# Patient Record
Sex: FEMALE | Race: WHITE | NOT HISPANIC OR LATINO | URBAN - METROPOLITAN AREA
[De-identification: names, ages, dates, MRNs, and addresses within clinical notes are randomized per-mention and may not be internally consistent; named-entity substitution may affect disease eponyms.]

---

## 2023-12-04 PROBLEM — K14.0 TONGUE ULCER: Status: ACTIVE | Noted: 2023-12-04

## 2023-12-04 RX ORDER — TRIAMCINOLONE ACETONIDE 1 MG/G
1 PASTE DENTAL 3 TIMES DAILY
COMMUNITY
Start: 2021-04-26

## 2023-12-04 RX ORDER — ESCITALOPRAM OXALATE 10 MG/1
1 TABLET ORAL DAILY
COMMUNITY
Start: 2016-05-26

## 2023-12-04 RX ORDER — FEXOFENADINE HCL AND PSEUDOEPHEDRINE HCI 180; 240 MG/1; MG/1
1 TABLET, EXTENDED RELEASE ORAL DAILY
COMMUNITY
Start: 2015-12-08

## 2023-12-04 RX ORDER — NORETHINDRONE ACETATE AND ETHINYL ESTRADIOL 1.5-30(21)
1 KIT ORAL DAILY
COMMUNITY
Start: 2017-04-27 | End: 2024-01-31 | Stop reason: SDUPTHER

## 2023-12-04 RX ORDER — ALBUTEROL SULFATE 90 UG/1
2 AEROSOL, METERED RESPIRATORY (INHALATION) EVERY 4 HOURS PRN
COMMUNITY
Start: 2016-04-19

## 2023-12-04 RX ORDER — LEVOCETIRIZINE DIHYDROCHLORIDE 5 MG/1
1 TABLET, FILM COATED ORAL DAILY
COMMUNITY
Start: 2016-11-04

## 2023-12-04 RX ORDER — NORETHINDRONE ACETATE AND ETHINYL ESTRADIOL .03; 1.5 MG/1; MG/1
1 TABLET ORAL DAILY
COMMUNITY
Start: 2018-08-19

## 2023-12-08 NOTE — PROGRESS NOTES
Lakia Palacio female   1970 53 y.o.   [id number]      Chief Complaint  High risk evaluation    History Of Present Illness  Lakia Palacio is a very pleasant 53 year old  woman [facility]. She is here with her , James. She has family history of breast cancer in her paternal aunt, 30s. She denies breast surgery or biopsy.      BREAST IMAGIN2023 Bilateral diagnostic mammogram and ultrasound, indicates BI-RADS Category 2.      FEMALE HISTORY: menarche age 13, nulliparous, OCP's x 20+ years (currently on Junel to regulate cycles), premenopausal, LMP 2023, scattered fibroglandular tissue     FAMILY CANCER HISTORY:  Paternal Aunt: Breast cancer, 30s     Surgical History  She has a past surgical history that includes Other surgical history (2017).     Social History  She has no history on file for tobacco use, alcohol use, and drug use.    Family History  No family history on file.     Allergies  Penicillins    Medications  Current Outpatient Medications   Medication Instructions    albuterol (ProAir HFA) 90 mcg/actuation inhaler 2 puffs, inhalation, Every 4 hours PRN    escitalopram (Lexapro) 10 mg tablet 1 tablet, oral, Daily    fexofenadine-pseudoephedrine (Allegra-D 24 Hour) 180-240 mg 24 hr tablet 1 tablet, oral, Daily    levocetirizine (Xyzal) 5 mg tablet 1 tablet, oral, Daily    norethindrone ac-eth estradioL (Microgestin 1.5/30, 21,) 1.5-30 mg-mcg tablet tablet 1 tablet, oral, Daily    norethindrone-e.estradioL-iron (Microgestin Fe 1.5/30, 28,) 1.5 mg-30 mcg (21)/75 mg (7) tablet 1 tablet, oral, Daily    triamcinolone (Kenalog) 0.1 % oral paste 1 Application, Mouth/Throat, 3 times daily         REVIEW OF SYSTEMS    Constitutional:  Negative for appetite change, fatigue, fever and unexpected weight change.   HENT:  Negative for ear pain, hearing loss, nosebleeds, sore throat and trouble swallowing.    Eyes:  Negative for  discharge, itching and visual disturbance.   Respiratory:  Negative for cough, chest tightness and shortness of breath.    Cardiovascular:  Negative for chest pain, palpitations and leg swelling.   Breast: as indicated in HPI  Gastrointestinal:  Negative for abdominal pain, constipation, diarrhea and nausea.   Endocrine: Negative for cold intolerance and heat intolerance.   Genitourinary:  Negative for dysuria, frequency, hematuria, pelvic pain and vaginal bleeding.   Musculoskeletal:  Negative for arthralgias, back pain, gait problem, joint swelling and myalgias.   Skin:  Negative for color change and rash.   Allergic/Immunologic: Negative for environmental allergies and food allergies.   Neurological:  Negative for dizziness, tremors, speech difficulty, weakness, numbness and headaches.   Hematological:  Does not bruise/bleed easily.   Psychiatric/Behavioral:  Negative for agitation, dysphoric mood and sleep disturbance. The patient is not nervous/anxious.         Past Medical History  She has a past medical history of Personal history of cervical dysplasia, Personal history of other diseases of the circulatory system, and Personal history of other diseases of the respiratory system.     Physical Exam  Patient is alert and oriented x3 and in a relaxed and appropriate mood. Her gait is steady and hand grasps are equal. Sclera is clear. The breasts are nearly symmetrical. The tissue is soft without palpable abnormalities, discrete nodules or masses. The left breast skin and both nipples appear normal. The right inframammary fold has a superficial red rash likely from bra compression. There is no cervical, supraclavicular or axillary lymphadenopathy. Heart rate and rhythm normal, S1 and S2 appreciated. The lungs are clear to auscultation bilaterally. Abdomen is soft and non-tender.     Physical Exam     Last Recorded Vitals  There were no vitals filed for this visit.    Assessment/Plan   Normal clinical exam and  imaging, family history of breast cancer, scattered fibroglandular tissue     Plan: Return in June 2024 for bilateral screening mammogram and office visit.     Patient Discussion/Summary  Your clinical examination and imaging are normal. Please return in one year for mammogram and office visit or sooner if you have any problems or concerns.     You can see your health information, review clinical summaries from office visits & test results online when you follow your health with MY  Chart, a personal health record. To sign up go to [email or web address]. If you need assistance with signing up or trouble getting into your account call [employer] Patient Line 24/7 at [phone].    My office phone number is [phone] if you need to get in touch with me or have additional questions or concerns. Thank you for choosing [facility] and trusting me as your healthcare provider. I look forward to seeing you again at your next office visit. I am honored to be a provider on your health care team and I remain dedicated to helping you achieve your health goals.      Margy Walters, APRN-CNP

## 2023-12-14 ENCOUNTER — APPOINTMENT (OUTPATIENT)
Dept: SURGICAL ONCOLOGY | Facility: CLINIC | Age: 53
End: 2023-12-14
Payer: COMMERCIAL

## 2024-01-31 DIAGNOSIS — N95.1 CLIMACTERIC: Primary | ICD-10-CM

## 2024-01-31 RX ORDER — NORETHINDRONE ACETATE AND ETHINYL ESTRADIOL 1.5-30(21)
1 KIT ORAL DAILY
Qty: 28 TABLET | Refills: 0 | Status: SHIPPED | OUTPATIENT
Start: 2024-01-31 | End: 2024-02-06 | Stop reason: SDUPTHER

## 2024-01-31 NOTE — TELEPHONE ENCOUNTER
Patient called requesting #30 day supply of Loestrin Fe sent to [facility] in [place] and #90 day supply sent to mail order pharmacy.    Message routed to Bea Manzano CNP.

## 2024-02-06 DIAGNOSIS — N95.1 CLIMACTERIC: ICD-10-CM

## 2024-02-06 RX ORDER — NORETHINDRONE ACETATE AND ETHINYL ESTRADIOL 1.5-30(21)
1 KIT ORAL DAILY
Qty: 84 TABLET | Refills: 3 | Status: SHIPPED | OUTPATIENT
Start: 2024-02-06

## 2024-02-06 NOTE — TELEPHONE ENCOUNTER
Patient called requesting #90 day of OCP be sent to Mail order pharmacy.   Message routed to Bea Manzano CNP

## 2024-04-10 NOTE — PROGRESS NOTES
Patient ID: Lakia Palacio is a 54 y.o. female.  Primary Care Provider: Ochoa Willoughby MD    Subjective      Past Medical History  Problems    · History of cervical dysplasia (V13.22) (Z87.410)   · History of extrinsic asthma (V12.69) (Z87.09)   · History of mitral valve prolapse (V12.59) (Z86.79)     Surgical History  Problems    · History of Cervical Conization     Family History  Mother    · Family history of diabetes insipidus (V18.19) (Z83.49)  Paternal Aunt: Breast cancer, 30s     Social History  Problems    · Never a smoker     Allergies  Medication    · Penicillins   Rash; Recorded By: Yoon Tabor; 7/13/2017 9:53:25 AM   · Penicillins Cross [employer]ors   Recorded By: Rekha Flores; 5/29/2015 1:52:56 PM    Objective    There were no vitals taken for this visit.     Interval history:  Patient is a 54 year old female with hx of MATEO III s/p CKC 9/1/2011.  Last pap 4/2022 was negative, HPV-.   Not been seen since. Patient being seen by high risk breast clinic due to moms history of breast cancer.  Mammogram was negative and up to date.  She is still having regular monthly periods, very light.   Patient is currently sexually active, denies dyspareunia or vaginal dryness, frustrated due to lack of orgasm.   Patient denies any constipation or diarrhea.  Appetite has been good.  Energy levels are baseline.  Patient denies hematuria.  Patient denies urinary leakage or incontinence.      Physical Exam:    Constitutional: Doing well. FELA  Eyes: PERRL  ENMT: Moist mucus membranes  Head/Neck: Supple. Symmetrical  Cardiovascular: Regular, rate and rhythm. 2+ equal pulses of the extremities  Respiratory/Thorax: CTA. RRR. Chest rise symmetrical.  Gastrointestinal: Non-distended, soft, non-tender  Genitourinary: Cervical os visualized with no lesions, CKC changes, no CMT, small mobile uterus, no adnexal masses noted. Smooth vaginal walls.  Vulva with no lesions noted.   Musculoskeletal: ROM intact, no joint  swelling, normal strength  Extremities: No edema  Neurological: Alert and oriented x 3. Pleasant and cooperative.  Lymphatic: No lymphadenopathy. No lymphedema  Psychological: Appropriate mood and behavior  Skin: Warm and dry, no lesions, no rashes    A complete review of systems was performed and all systems were normal except what is noted in the interval history.          Assessment/Plan  Patient is a 54 year old female with hx of MAETO III s/p CKC 9/1/2011. Well woman exam.       PLAN:  Pap today, F/U results  If negative F/U in 1 year or as needed  Mammogram order

## 2024-04-11 ENCOUNTER — OFFICE VISIT (OUTPATIENT)
Dept: GYNECOLOGIC ONCOLOGY | Facility: CLINIC | Age: 54
End: 2024-04-11
Payer: COMMERCIAL

## 2024-04-11 VITALS
WEIGHT: 223.2 LBS | HEART RATE: 94 BPM | SYSTOLIC BLOOD PRESSURE: 137 MMHG | OXYGEN SATURATION: 96 % | HEIGHT: 65 IN | RESPIRATION RATE: 18 BRPM | DIASTOLIC BLOOD PRESSURE: 84 MMHG | BODY MASS INDEX: 37.19 KG/M2

## 2024-04-11 DIAGNOSIS — Z12.31 SCREENING MAMMOGRAM, ENCOUNTER FOR: ICD-10-CM

## 2024-04-11 DIAGNOSIS — N87.9 CERVICAL DYSPLASIA: Primary | ICD-10-CM

## 2024-04-11 PROCEDURE — 87624 HPV HI-RISK TYP POOLED RSLT: CPT

## 2024-04-11 PROCEDURE — 99213 OFFICE O/P EST LOW 20 MIN: CPT | Performed by: NURSE PRACTITIONER

## 2024-04-11 PROCEDURE — 88175 CYTOPATH C/V AUTO FLUID REDO: CPT

## 2024-04-22 LAB
CYTOLOGY CMNT CVX/VAG CYTO-IMP: NORMAL
HPV HR 12 DNA GENITAL QL NAA+PROBE: NEGATIVE
HPV HR GENOTYPES PNL CVX NAA+PROBE: NEGATIVE
HPV16 DNA SPEC QL NAA+PROBE: NEGATIVE
HPV18 DNA SPEC QL NAA+PROBE: NEGATIVE
LAB AP HPV GENOTYPE QUESTION: YES
LAB AP HPV HR: NORMAL
LAB AP PREVIOUS ABNORMAL HISTORY: NORMAL
LAB AP TREATMENT HISTORY: NORMAL
LABORATORY COMMENT REPORT: NORMAL
PATH REPORT.TOTAL CANCER: NORMAL

## 2025-01-17 DIAGNOSIS — T38.4X5D ORAL CONTRACEPTIVE CAUSING ADVERSE EFFECT IN THERAPEUTIC USE, SUBSEQUENT ENCOUNTER: Primary | ICD-10-CM

## 2025-01-17 DIAGNOSIS — Z30.41 ORAL CONTRACEPTIVE USE: ICD-10-CM

## 2025-01-17 RX ORDER — NORETHINDRONE ACETATE AND ETHINYL ESTRADIOL .03; 1.5 MG/1; MG/1
1 TABLET ORAL DAILY
Qty: 63 TABLET | Refills: 3 | Status: SHIPPED | OUTPATIENT
Start: 2025-01-17

## 2025-01-17 NOTE — TELEPHONE ENCOUNTER
Received faxed refill request for Aurovelta tabs from [facility].     Refill pended to Bea Manzano CNP

## 2025-03-11 ENCOUNTER — OFFICE VISIT (OUTPATIENT)
Dept: URGENT CARE | Age: 55
End: 2025-03-11
Payer: COMMERCIAL

## 2025-03-11 VITALS
RESPIRATION RATE: 18 BRPM | TEMPERATURE: 97.7 F | WEIGHT: 210 LBS | BODY MASS INDEX: 34.99 KG/M2 | HEART RATE: 94 BPM | HEIGHT: 65 IN | DIASTOLIC BLOOD PRESSURE: 83 MMHG | OXYGEN SATURATION: 98 % | SYSTOLIC BLOOD PRESSURE: 142 MMHG

## 2025-03-11 DIAGNOSIS — J45.21 MILD INTERMITTENT ASTHMA WITH EXACERBATION (HHS-HCC): Primary | ICD-10-CM

## 2025-03-11 PROCEDURE — 3008F BODY MASS INDEX DOCD: CPT | Performed by: PHYSICIAN ASSISTANT

## 2025-03-11 PROCEDURE — 99204 OFFICE O/P NEW MOD 45 MIN: CPT | Performed by: PHYSICIAN ASSISTANT

## 2025-03-11 RX ORDER — PREDNISONE 20 MG/1
TABLET ORAL
Qty: 10 TABLET | Refills: 0 | Status: SHIPPED | OUTPATIENT
Start: 2025-03-11

## 2025-03-11 RX ORDER — BROMPHENIRAMINE MALEATE, PSEUDOEPHEDRINE HYDROCHLORIDE, AND DEXTROMETHORPHAN HYDROBROMIDE 2; 30; 10 MG/5ML; MG/5ML; MG/5ML
10 SYRUP ORAL 4 TIMES DAILY PRN
Qty: 250 ML | Refills: 0 | Status: SHIPPED | OUTPATIENT
Start: 2025-03-11 | End: 2025-03-21

## 2025-03-11 RX ORDER — FEXOFENADINE HCL AND PSEUDOEPHEDRINE HCL 180; 240 MG/1; MG/1
1 TABLET, EXTENDED RELEASE ORAL DAILY
Qty: 30 TABLET | Refills: 0 | Status: SHIPPED | OUTPATIENT
Start: 2025-03-11 | End: 2026-03-11

## 2025-03-11 RX ORDER — ALBUTEROL SULFATE 90 UG/1
2 INHALANT RESPIRATORY (INHALATION) EVERY 6 HOURS PRN
Qty: 18 G | Refills: 0 | Status: SHIPPED | OUTPATIENT
Start: 2025-03-11 | End: 2026-03-11

## 2025-03-11 NOTE — PROGRESS NOTES
"Subjective   Patient ID: Lakia Palacio is a 54 y.o. female. They present today with a chief complaint of Asthma (Asthma cough that keeps her up at night.  had a cold last week and it triggered her asthma. Doesn't use regular inhaler. ).    History of Present Illness  Patient is a pleasant 54-year-old white female, past medical history of asthma, presenting to clinic for chief complaint of asthma exacerbation.  Patient states she has had a cold for the past couple of days and feels like her asthma is flaring.  She states she feels like she is wheezing a little bit and coughing more than normal.  She does report that her asthma usually results in more coughing and wheezing.  She denies any chest pain or shortness of breath.  No fever or chills.  She does report upper respiratory congestion and rhinorrhea.  No abdominal pain, nausea, vomiting.  No chest pain or shortness of breath.  No further complaints.        Asthma      Past Medical History  Allergies as of 03/11/2025 - Reviewed 01/17/2025   Allergen Reaction Noted    Penicillins Rash 12/04/2023       (Not in a hospital admission)         Past Medical History:   Diagnosis Date    Personal history of cervical dysplasia     History of cervical dysplasia    Personal history of other diseases of the circulatory system     History of mitral valve prolapse    Personal history of other diseases of the respiratory system     History of extrinsic asthma       Past Surgical History:   Procedure Laterality Date    OTHER SURGICAL HISTORY  07/13/2017    Cervical Conization            Review of Systems  Review of Systems                               Objective    Vitals:    03/11/25 1626   BP: 142/83   Pulse: 94   Resp: 18   Temp: 36.5 °C (97.7 °F)   TempSrc: Temporal   SpO2: 98%   Weight: 95.3 kg (210 lb)   Height: 1.651 m (5' 5\")     No LMP recorded.    Physical Exam  General: Vitals Noted. No distress. Normocephalic.     HEENT: TMs normal, EOMI, normal " conjunctiva, patent nares, Normal OP    Neck: Supple with no adenopathy.     Cardiac: Regular Rate and Rhythm. No murmur.     Pulmonary: Faint end expiratory wheezing in the lower lung fields bilaterally with no associated rales or rhonchi.  She has good chest wall excursion with equal breath sounds throughout.  No signs of acute distress.    Abdomen: Soft, non-tender, with normal bowel sounds.     Musculoskeletal: Moves all extremities, no effusion, no edema.     Skin: No obvious rashes.  Procedures    Point of Care Test & Imaging Results from this visit    No results found.    Diagnostic study results (if any) were reviewed by Fadi Yepez PA-C.    Assessment/Plan   Allergies, medications, history, and pertinent labs/EKGs/Imaging reviewed by Fadi Yepez PA-C.     Medical Decision Making  Patient was seen eval in the clinic for to complaint of cough.  On exam patient is nontoxic very well-appearing respite comfortably no acute distress.  Vital signs are stable, afebrile.  Heart is regular belly soft and nontender.  She does have faint end expiratory wheezing in lower lung fields bilaterally with overall good air exchange good chest wall excursion and no signs of acute distress.  Based on patient's history and physical exam I do feel she has a viral illness.  I feel this is causing a flare of her asthma.  Will treat with prednisone 40 mg daily x 5 days provided albuterol inhaler and also divided Bromfed-DM to use as needed for cough and congestion.  Advise follow-up primary care physician in the next week.  Reviewed my impression, plan, strict return versus report to ED precautions with the patient.  She expresses understanding and agreement plan of care.      Orders and Diagnoses  Diagnoses and all orders for this visit:  Mild intermittent asthma with exacerbation ([facility])  -     brompheniramine-pseudoeph-DM 2-30-10 mg/5 mL syrup; Take 10 mL by mouth 4 times a day as needed for congestion or cough  for up to 10 days.  -     predniSONE (Deltasone) 20 mg tablet; Take two tablets per day for 5 days  -     albuterol 90 mcg/actuation inhaler; Inhale 2 puffs every 6 hours if needed for wheezing.  -     fexofenadine-pseudoephedrine (Allegra-D 24) 180-240 mg 24 hr tablet; Take 1 tablet by mouth once daily. Do not crush, chew, or split.        Medical Admin Record      Follow Up Instructions  No follow-ups on file.    Patient disposition: Home    Electronically signed by Fadi Yepez PA-C  4:56 PM

## 2025-03-12 ENCOUNTER — APPOINTMENT (OUTPATIENT)
Dept: URGENT CARE | Age: 55
End: 2025-03-12
Payer: COMMERCIAL

## 2025-03-12 RX ORDER — HYDROXYZINE PAMOATE 25 MG/1
25 CAPSULE ORAL 3 TIMES DAILY PRN
Qty: 30 CAPSULE | Refills: 0 | Status: SHIPPED | OUTPATIENT
Start: 2025-03-12 | End: 2025-03-22

## 2025-04-02 ENCOUNTER — OFFICE VISIT (OUTPATIENT)
Dept: URGENT CARE | Age: 55
End: 2025-04-02
Payer: COMMERCIAL

## 2025-04-02 VITALS
BODY MASS INDEX: 35.82 KG/M2 | SYSTOLIC BLOOD PRESSURE: 127 MMHG | DIASTOLIC BLOOD PRESSURE: 82 MMHG | WEIGHT: 215 LBS | HEIGHT: 65 IN | HEART RATE: 86 BPM | TEMPERATURE: 98.6 F | OXYGEN SATURATION: 98 %

## 2025-04-02 DIAGNOSIS — J40 BRONCHITIS: Primary | ICD-10-CM

## 2025-04-02 PROCEDURE — 3008F BODY MASS INDEX DOCD: CPT | Performed by: PHYSICIAN ASSISTANT

## 2025-04-02 PROCEDURE — 99213 OFFICE O/P EST LOW 20 MIN: CPT | Performed by: PHYSICIAN ASSISTANT

## 2025-04-02 PROCEDURE — 1036F TOBACCO NON-USER: CPT | Performed by: PHYSICIAN ASSISTANT

## 2025-04-02 RX ORDER — BENZONATATE 200 MG/1
200 CAPSULE ORAL 2 TIMES DAILY
Qty: 14 CAPSULE | Refills: 0 | Status: SHIPPED | OUTPATIENT
Start: 2025-04-02 | End: 2025-04-09

## 2025-04-02 RX ORDER — DOXYCYCLINE 100 MG/1
100 TABLET ORAL 2 TIMES DAILY
Qty: 14 TABLET | Refills: 0 | Status: SHIPPED | OUTPATIENT
Start: 2025-04-02 | End: 2025-04-09

## 2025-04-02 ASSESSMENT — PAIN SCALES - GENERAL: PAINLEVEL_OUTOF10: 0-NO PAIN

## 2025-04-02 NOTE — PATIENT INSTRUCTIONS
Make sure you are drinking plenty of fluids at home. Use your albuterol inhaler as prescribed. Take Vitamin C, zinc and vitamin D to help with immunity. Follow up with PCP or pulmonology for persistent symptoms.

## 2025-04-02 NOTE — PROGRESS NOTES
"Subjective   Patient ID: Lakia Palacio is a 55 y.o. female. They present today with a chief complaint of Cough (Cough over 3 weeks /).    History of Present Illness  Patient is a 55-year-old female who with a history of asthma who presents for cough and congestion for the past 3 weeks.  Patient states that she was seen for similar 3 weeks ago.  States that she has asthma that is flared up by allergies and cold symptoms.  At her last visit, she was prescribed an albuterol inhaler, cough syrup and a prednisone burst.  States that her symptoms resided for a few days before coming right back.  She notes a persistent dry cough which wakes her up at night.  States that she has barely been sleeping secondary to her symptoms.  She denies any chest pain, shortness of breath, fevers or chills.  States that she is now starting to have sinus congestion as well.    Past Medical History  Allergies as of 04/02/2025 - Reviewed 04/02/2025   Allergen Reaction Noted    Penicillins Rash 12/04/2023       (Not in a hospital admission)       Past Medical History:   Diagnosis Date    Personal history of cervical dysplasia     History of cervical dysplasia    Personal history of other diseases of the circulatory system     History of mitral valve prolapse    Personal history of other diseases of the respiratory system     History of extrinsic asthma       Past Surgical History:   Procedure Laterality Date    OTHER SURGICAL HISTORY  07/13/2017    Cervical Conization        reports that she has never smoked. She has never used smokeless tobacco. She reports that she does not drink alcohol and does not use drugs.    Review of Systems  ROS is negative unless otherwise stated in HPI.         Objective    Vitals:    04/02/25 1650   BP: 127/82   Pulse: 86   Temp: 37 °C (98.6 °F)   SpO2: 98%   Weight: 97.5 kg (215 lb)   Height: 1.651 m (5' 5\")     No LMP recorded.      VS: As documented in the triage note and EMR flowsheet from this visit " was reviewed  General: Well appearing. No acute distress.   Eyes:  Extraocular movements grossly intact. No scleral icterus.   Head: Atraumatic. Normocephalic.     Neck: No meningismus. No gross masses. Full movement through range of motion  ENT: Posterior oropharynx shows no erythema, exudate or edema.  Uvula is midline without edema.  No stridor or trismus  CV: Regular rhythm. No murmurs, rubs, gallops appreciated.   Resp: Clear to auscultation bilaterally. No respiratory distress.    GI: Nontender. Soft. No masses. No rebound, rigidity or guarding.   MSK: Symmetric muscle bulk. No gross step offs or deformities.  Skin: Warm, dry. No rashes  Neuro: CN II-VII intact. A&O x3. Speech fluent. Alert. Moving all extremities. Ambulates with normal gait  Psych: Appropriate mood and affect for situation      Point of Care Test & Imaging Results from this visit  No results found for this visit on 04/02/25.   Imaging  No results found.    Cardiology, Vascular, and Other Imaging  No other imaging results found for the past 2 days      Diagnostic study results (if any) were reviewed by Jackie Herr PA-C.    Assessment/Plan   Allergies, medications, history, and pertinent labs/EKGs/Imaging reviewed by Jackie Herr PA-C.     Medical Decision Making  Patient is a 55-year-old female who presents for cough and congestion.  Notes that cough has been present over the past 3 weeks.  Notes past history of asthma.  She was seen 3 weeks ago and prescribed albuterol inhaler, cough syrup and prednisone burst however symptoms returned very shortly afterward.  On examination, patient well-appearing.  Vitals are stable.  No respiratory distress.  Satting 98% on room air.  Cardiopulmonary examination without any adventitious breath sounds.  Will treat for bronchitis.  Given longevity of symptoms will trial a course of doxycycline as well as benzonatate.  States that she has follow-up with her pulmonologist in the near future.    Orders  and Diagnoses  There are no diagnoses linked to this encounter.    Medical Admin Record      Patient disposition: Home    Electronically signed by Jackie Herr PA-C  5:05 PM

## 2025-04-02 NOTE — PROGRESS NOTES
Subjective   Lakia Palacio is a 55 y.o. female who presents for NPV TO ESTABLISH PCP CARE.     EMR/EPIC records reviewed    HPI:      55 y.o. female who presents for NPV TO ESTABLISH PCP CARE.    Last saw prior  [facility] PCP 1/7/25 for ANNUAL PHYSICAL EXAM.     Seen at urgent care 3/11/25 for asthma exacerbation, and discharged home with albuterol and steroid 40 mg daily x 5 days    PMHx:  Mild, persistent Asthma  anxiety  Trochanteric bursitis of both hips   Allergic rhinitis  Mitral valve prolapse  Cervical dysplasia    Healthcare Providers:  -GYN onc: Bea Manzano, APRN-CNP   -pulmonology: NONE  -prior [facility]  PCP: Dr. Willoughby          Preventive Health Services:  -Last physical: 1/2025  -last pap smear: 4/11/24 Negative for intraepithelial lesion or malignancy, HR HPV negative  -last mammogram: 6/8/23==> NOW DUE  -last colonoscopy/cologuard: ?; NOW DUE  -last STI screening: ?  -Hep C screening: ?       Immunizations:  -Childhood vaccines: completed per patient  -updated COVID spike vaccine: NOW DUE  -TDAP: NPOW DUE  -Prevanr 20: NOW DUE  -Shingrix: NOW DUE  -Flu vaccine: DUE NOW    Immunization History   Administered Date(s) Administered    COVID-19, mRNA, LNP-S, PF, 30 mcg/0.3 mL dose 03/24/2021, 04/14/2021, 12/29/2021    Hep A, Unspecified 09/29/2006, 03/30/2007    Hepatitis B vaccine, adult *Check Product/Dose* 09/29/2006, 10/27/2006, 03/30/2007    Pfizer COVID-19 vaccine, bivalent, age 12 years and older (30 mcg/0.3 mL) 03/31/2023    Pfizer Gray Cap SARS-CoV-2 07/21/2022    Tdap vaccine, age 7 year and older (BOOSTRIX, ADACEL) 09/29/2006    Typhoid, oral 09/29/2006        Today  Lakia reports:     - doing and feeling well     -taking all medications as prescribed with no reported adverse medication side effects          Today she  has no other reported complaints, issues, or problems.  ROS is NEG for HEADACHE, NAUSEA, VOMITING, DIARRHEA, CHEST PAIN, SOB, and BLEEDING.  Review of systems (12) is  negative for all systems except for any identified issues in HPI above.      Objective     There were no vitals taken for this visit.     Physical Examination:       GENERAL           General Appearance: well-appearing, well-developed, well-hydrated, well-nourished, no acute distress.        HEENT           NECK supple, no masses or thyromegaly, no carotid bruit.        EYES           Extraocular Movements: normal, bilateral eyes WENDY, no conjunctival injection.        HEART           Rate and Rhythm regular rate and rhythm. Heart sounds: normal S1S2, no S3 or S4. Murmurs: none.        CHEST           Breath sounds: Clear to IPPA, RR<16 no use of accessory muscles.        ABDOMEN           General: Neg for LKKS or masses, no scleral icterus or jaundice.        MUSCULOSKELETAL           Joints Demonstration: Neg for erythema, swelling or joint deformities. gross abnormalities no gross abnormalities.        EXTREMITIES           Lower Extremities: Neg for cyanosis, clubbing or edema.       Assessment/Plan   Problem List Items Addressed This Visit    None    Mild persistent Asthma: + asthma exacerbation 3/11/25  -cont albuterol as needed and montelukast  -referral to pulmonology for PFTs and evaluation  -Emergency Dept precautions discussed and reviewed with patient    Obesity: BMI 34  -CMP, lipid panel, HgBA1c, and TSH ordered  -low fat diet, regularly exercise, and limit alcohol intake    Seasonal allergies:  -cont allergy medications    Anxiety: well controlled.  -cont lexapro 20 mg daily       Lipid Screening  -lipid panel ordered     Diabetes Screening  -HgBA1c ordered     Vitamin D deficiency  -Vit D levels ordered     Hep C screening  -Hep C antibody ordered     STI Screening:  -HIV, syphilis, GC/CT/trich ordered    Breast cancer screening:  -mammogram ordered      Colon Cancer Screening  -colonoscopy and cologuard ordered; patient advised to complete only 1 of these screenings    Heart Disease Screening:  -CT  Heart Ca scoring ordered       Counseling:      Medication education:        Education:  The patient is counseled regarding potential side-effects of all new medications        Understanding:  Patient expressed understanding        Adherence:  No barriers to adherence identified        Immunizations Counseling  -flu vaccine, Prevnar 20, TDAP now due==> declined  -recommend updated COVID spike vaccine, and shingles that can be obtained at local pharmacy     FOLLOW-UP: 4 weeks to discuss and review test results    I have personally reviewed all available pertinent labs, imaging, and consult notes with the patient.     Discussed recommended plan of care with patient. Patient expressed understanding and agreement with plan of care. All of patient's  questions were answered at the time. Patient had no additional questions at the time.             Debora Valerio MD, PhD

## 2025-04-03 ENCOUNTER — APPOINTMENT (OUTPATIENT)
Dept: PRIMARY CARE | Facility: CLINIC | Age: 55
End: 2025-04-03
Payer: COMMERCIAL

## 2025-04-03 DIAGNOSIS — Z13.1 DIABETES MELLITUS SCREENING: ICD-10-CM

## 2025-04-03 DIAGNOSIS — J45.30 MILD PERSISTENT ASTHMA, UNSPECIFIED WHETHER COMPLICATED (HHS-HCC): Primary | ICD-10-CM

## 2025-04-03 DIAGNOSIS — E55.9 VITAMIN D DEFICIENCY: ICD-10-CM

## 2025-04-03 DIAGNOSIS — Z11.59 ENCOUNTER FOR HEPATITIS C SCREENING TEST FOR LOW RISK PATIENT: ICD-10-CM

## 2025-04-03 DIAGNOSIS — Z13.220 LIPID SCREENING: ICD-10-CM

## 2025-04-03 DIAGNOSIS — Z01.89 ENCOUNTER FOR ROUTINE LABORATORY TESTING: ICD-10-CM

## 2025-04-03 DIAGNOSIS — Z12.31 BREAST CANCER SCREENING BY MAMMOGRAM: ICD-10-CM

## 2025-04-03 DIAGNOSIS — Z12.11 COLON CANCER SCREENING: ICD-10-CM

## 2025-04-03 DIAGNOSIS — F41.9 ANXIETY: ICD-10-CM

## 2025-04-03 DIAGNOSIS — J30.2 SEASONAL ALLERGIES: ICD-10-CM

## 2025-04-03 DIAGNOSIS — Z11.3 ROUTINE SCREENING FOR STI (SEXUALLY TRANSMITTED INFECTION): ICD-10-CM

## 2025-04-03 DIAGNOSIS — Z13.6 ENCOUNTER FOR SCREENING FOR CORONARY ARTERY DISEASE: ICD-10-CM

## 2025-04-17 ENCOUNTER — APPOINTMENT (OUTPATIENT)
Dept: GYNECOLOGIC ONCOLOGY | Facility: CLINIC | Age: 55
End: 2025-04-17
Payer: COMMERCIAL

## 2025-04-24 ENCOUNTER — APPOINTMENT (OUTPATIENT)
Dept: GYNECOLOGIC ONCOLOGY | Facility: CLINIC | Age: 55
End: 2025-04-24
Payer: COMMERCIAL

## 2025-08-11 ENCOUNTER — OFFICE VISIT (OUTPATIENT)
Dept: URGENT CARE | Age: 55
End: 2025-08-11
Payer: COMMERCIAL

## 2025-08-11 VITALS
DIASTOLIC BLOOD PRESSURE: 86 MMHG | SYSTOLIC BLOOD PRESSURE: 126 MMHG | RESPIRATION RATE: 19 BRPM | HEART RATE: 95 BPM | TEMPERATURE: 97.5 F | OXYGEN SATURATION: 98 %

## 2025-08-11 DIAGNOSIS — J45.21 MILD INTERMITTENT ASTHMA WITH EXACERBATION (HHS-HCC): Primary | ICD-10-CM

## 2025-08-11 PROCEDURE — 99214 OFFICE O/P EST MOD 30 MIN: CPT | Performed by: PHYSICIAN ASSISTANT

## 2025-08-11 PROCEDURE — 1036F TOBACCO NON-USER: CPT | Performed by: PHYSICIAN ASSISTANT

## 2025-08-11 RX ORDER — PREDNISONE 20 MG/1
40 TABLET ORAL DAILY
Qty: 10 TABLET | Refills: 0 | Status: SHIPPED | OUTPATIENT
Start: 2025-08-11 | End: 2025-08-16

## 2025-08-11 ASSESSMENT — PATIENT HEALTH QUESTIONNAIRE - PHQ9
2. FEELING DOWN, DEPRESSED OR HOPELESS: NOT AT ALL
1. LITTLE INTEREST OR PLEASURE IN DOING THINGS: NOT AT ALL
SUM OF ALL RESPONSES TO PHQ9 QUESTIONS 1 AND 2: 0

## 2025-08-11 ASSESSMENT — ENCOUNTER SYMPTOMS: COUGH: 1

## 2025-08-16 ENCOUNTER — OFFICE VISIT (OUTPATIENT)
Dept: URGENT CARE | Age: 55
End: 2025-08-16
Payer: COMMERCIAL

## 2025-08-16 VITALS
OXYGEN SATURATION: 98 % | RESPIRATION RATE: 18 BRPM | TEMPERATURE: 98 F | BODY MASS INDEX: 36.44 KG/M2 | HEART RATE: 84 BPM | WEIGHT: 219 LBS | DIASTOLIC BLOOD PRESSURE: 71 MMHG | SYSTOLIC BLOOD PRESSURE: 131 MMHG

## 2025-08-16 DIAGNOSIS — J40 BRONCHITIS: Primary | ICD-10-CM

## 2025-08-16 PROCEDURE — 99213 OFFICE O/P EST LOW 20 MIN: CPT | Performed by: PHYSICIAN ASSISTANT

## 2025-08-16 RX ORDER — DOXYCYCLINE 100 MG/1
100 CAPSULE ORAL 2 TIMES DAILY
Qty: 14 CAPSULE | Refills: 0 | Status: SHIPPED | OUTPATIENT
Start: 2025-08-16 | End: 2025-08-23

## 2025-08-16 ASSESSMENT — ENCOUNTER SYMPTOMS: COUGH: 1
